# Patient Record
Sex: FEMALE | Race: BLACK OR AFRICAN AMERICAN | NOT HISPANIC OR LATINO | Employment: OTHER | URBAN - METROPOLITAN AREA
[De-identification: names, ages, dates, MRNs, and addresses within clinical notes are randomized per-mention and may not be internally consistent; named-entity substitution may affect disease eponyms.]

---

## 2020-01-31 ENCOUNTER — OFFICE VISIT (OUTPATIENT)
Dept: GASTROENTEROLOGY | Facility: CLINIC | Age: 70
End: 2020-01-31
Payer: COMMERCIAL

## 2020-01-31 VITALS
HEART RATE: 69 BPM | WEIGHT: 115 LBS | BODY MASS INDEX: 19.63 KG/M2 | HEIGHT: 64 IN | DIASTOLIC BLOOD PRESSURE: 78 MMHG | SYSTOLIC BLOOD PRESSURE: 120 MMHG

## 2020-01-31 DIAGNOSIS — R14.0 BLOATING: ICD-10-CM

## 2020-01-31 DIAGNOSIS — D50.9 IRON DEFICIENCY ANEMIA, UNSPECIFIED IRON DEFICIENCY ANEMIA TYPE: ICD-10-CM

## 2020-01-31 DIAGNOSIS — R14.0 BLOATING: Primary | ICD-10-CM

## 2020-01-31 DIAGNOSIS — K90.41 NCGS (NON-CELIAC GLUTEN SENSITIVITY): ICD-10-CM

## 2020-01-31 DIAGNOSIS — K59.01 SLOW TRANSIT CONSTIPATION: Primary | ICD-10-CM

## 2020-01-31 DIAGNOSIS — Z12.11 COLON CANCER SCREENING: ICD-10-CM

## 2020-01-31 PROCEDURE — 99204 OFFICE O/P NEW MOD 45 MIN: CPT | Performed by: INTERNAL MEDICINE

## 2020-01-31 RX ORDER — FERROUS SULFATE 325(65) MG
325 TABLET ORAL
COMMUNITY

## 2020-01-31 RX ORDER — WHEAT DEXTRIN 3 G/3.8 G
POWDER (GRAM) ORAL
Refills: 0
Start: 2020-01-31

## 2020-01-31 NOTE — PATIENT INSTRUCTIONS
Constipation   WHAT YOU NEED TO KNOW:   Constipation is when you have hard, dry bowel movements, or you go longer than usual between bowel movements  DISCHARGE INSTRUCTIONS:   Return to the emergency department if:   · You have blood in your bowel movements  · You have a fever and abdominal pain with the constipation  Contact your healthcare provider if:   · Your constipation gets worse  · You start to vomit  · You have questions or concerns about your condition or care  Medicines:   · Medicine or a fiber supplement  may help make your bowel movement softer  A laxative may help relax and loosen your intestines to help you have a bowel movement  You may also be given medicine to increase fluid in your intestines  The fluid may help move bowel movements through your intestines  · Take your medicine as directed  Contact your healthcare provider if you think your medicine is not helping or if you have side effects  Tell him of her if you are allergic to any medicine  Keep a list of the medicines, vitamins, and herbs you take  Include the amounts, and when and why you take them  Bring the list or the pill bottles to follow-up visits  Carry your medicine list with you in case of an emergency  Manage your constipation:   · Drink liquids as directed  You may need to drink extra liquids to help soften and move your bowels  Ask how much liquid to drink each day and which liquids are best for you  · Eat high-fiber foods  This may help decrease constipation by adding bulk to your bowel movements  High-fiber foods include fruit, vegetables, whole-grain breads and cereals, and beans  Your healthcare provider or dietitian can help you create a high-fiber meal plan  · Exercise regularly  Regular physical activity can help stimulate your intestines  Ask which exercises are best for you  · Schedule a time each day to have a bowel movement    This may help train your body to have regular bowel movements  Bend forward while you are on the toilet to help move the bowel movement out  Sit on the toilet for at least 10 minutes, even if you do not have a bowel movement  Follow up with your healthcare provider as directed:  Write down your questions so you remember to ask them during your visits  © 2017 [address] Information is for End User's use only and may not be sold, redistributed or otherwise used for commercial purposes  All illustrations and images included in CareNotes® are the copyrighted property of [employer] "[employer]  or [employer]  The above information is an  only  It is not intended as medical advice for individual conditions or treatments  Talk to your doctor, nurse or pharmacist before following any medical regimen to see if it is safe and effective for you

## 2020-01-31 NOTE — PROGRESS NOTES
[address] [place] [address] [facility]  Ta Pinto 78 y o  female MRN: [id number]  Encounter: [id number]    ASSESSMENT AND PLAN:      1  Slow transit constipation  65-year-old female here today at the request of Dr Monica Villarreal for 1 year of progressive bloating and constipation with gluten intolerance  It looks like slow transit constipation given she is moving her bowels daily, just incompletely  She has a fairly healthy lifestyle and diet  - will start with some Benefiber and see if this is enough to get her bowels moving completely  If not, we can start some MiraLax  - Wheat Dextrin (BENEFIBER) POWD; Take by mouth daily with breakfast; Refill: 0  - CBC and differential; Future  - TSH, 3rd generation with Free T4 reflex; Future    2  NCGS (non-celiac gluten sensitivity)  About 5 years of gluten sensitivity, she is pretty much 90% gluten free  But not completely  Check celiac serologies  Check EGD to biopsy  - Schedule EGD @ [address] [place] [address]   - Celiac Disease Antibody Profile; Future    3  Bloating  Continue dairy and gluten limited diets  4  Iron deficiency anemia, unspecified iron deficiency anemia type  Intermittent issues with iron deficiency anemia  She takes iron supplementation for the past 20 years  Unclear if this is really necessary  She is mainly vegetarian  Check iron studies today  - CBC and differential; Future  - Fe+TIBC+Victor Manuel; Future    5  Colon cancer screening  Average risk, had a negative colonoscopy in 2009 and is overdue  We will schedule today  - Schedule Colonoscopy @ [address] [place] [address]  Followup Appointment:  6 months  ______________________________________________________________________    Chief Complaint   Patient presents with   Jeffrey Ramos       HPI:   Ta Pinto is a 78y o  year old female who presents today as a new patient at the request of Dr Monica Villarreal for 1 year of progressive bloating    Patient states that she is a vegetarian mainly, also mainly 6 to gluten and dairy free diet  She is very healthy eating, exercises regularly, drinks a lot of water  About 5 years ago, she was having some issues with bloating where she felt that her belly was getting slightly distended, passing a lot of gas from below  However, she started to do dairy and gluten limited diet switch improved  However in the past year, she is progressively getting more bloating  She has also not moving her bowels completely  She moves her bowels daily but has often trouble starting the bowel movement  Often has to strain  Denies bleeding  Weight is stable  Appetite is good  Denies nausea vomiting  No real upper GI symptoms  Did have some heartburn for a little bit but started taking aloe juice which helps  Historical Information   Past Medical History:   Diagnosis Date    Anemia     Multiple thyroid nodules     Osteopenia      Past Surgical History:   Procedure Laterality Date    BREAST BIOPSY      COLONOSCOPY  2009    normal, @ [facility]    HYSTERECTOMY      KNEE SURGERY      cyst removal     Social History     Substance and Sexual Activity   Alcohol Use Yes    Frequency: Monthly or less     Social History     Substance and Sexual Activity   Drug Use Never     Social History     Tobacco Use   Smoking Status Never Smoker   Smokeless Tobacco Never Used     Family History   Problem Relation Age of Onset    Colon polyps Neg Hx     Colon cancer Neg Hx     Celiac disease Neg Hx        Meds/Allergies     Current Outpatient Medications:     ferrous sulfate 325 (65 Fe) mg tablet    Wheat Dextrin (BENEFIBER) POWD    No Known Allergies    PHYSICAL EXAM:    Blood pressure 120/78, pulse 69, height 5' 4" (1 626 m), weight 52 2 kg (115 lb)  Body mass index is 19 74 kg/m²  General Appearance: NAD, cooperative, alert  Eyes: Anicteric, PERRLA, EOMI  ENT:  Normocephalic, atraumatic, normal mucosa      Neck:  Supple, symmetrical, trachea midline,   Resp:  Clear to auscultation bilaterally; no rales, rhonchi or wheezing; respirations unlabored   CV:  S1 S2, Regular rate and rhythm; no murmur, rub, or gallop  GI:  Soft, non-tender, non-distended; normal bowel sounds; no masses, no organomegaly   Rectal: Deferred  Musculoskeletal: No cyanosis, clubbing or edema  Normal ROM  Skin:  No jaundice, rashes, or lesions   Heme/Lymph: No palpable cervical lymphadenopathy  Psych: Normal affect, good eye contact  Neuro: No gross deficits, AAOx3    Lab Results:   No results found for: WBC, HGB, HCT, MCV, PLT  No results found for: NA, K, CL, CO2, ANIONGAP, BUN, CREATININE, GLUCOSE, GLUF, CALCIUM, CORRECTEDCA, AST, ALT, ALKPHOS, PROT, BILITOT, EGFR  No results found for: IRON, TIBC, FERRITIN  No results found for: LIPASE    Radiology Results:   No results found  REVIEW OF SYSTEMS:    CONSTITUTIONAL: Denies any fever, chills, rigors, and weight loss  HEENT: No earache or tinnitus  Denies hearing loss or visual disturbances  CARDIOVASCULAR: No chest pain or palpitations  RESPIRATORY: Denies any cough, hemoptysis, shortness of breath or dyspnea on exertion  GASTROINTESTINAL: As noted in the History of Present Illness  GENITOURINARY: No problems with urination  Denies any hematuria or dysuria  NEUROLOGIC: No dizziness or vertigo, denies headaches  MUSCULOSKELETAL: Denies any muscle or joint pain  SKIN: Denies skin rashes or itching  ENDOCRINE: Denies excessive thirst  Denies intolerance to heat or cold  PSYCHOSOCIAL: Denies depression or anxiety  Denies any recent memory loss

## 2020-01-31 NOTE — LETTER
January 31, 2020     Xiang Garcia, [address]    Patient: Anisha Kong   YOB: 1950   Date of Visit: 1/31/2020       Dear Dr Fay Sebastian: Thank you for referring Anisha Kong to me for evaluation  Below are my notes for this consultation  If you have questions, please do not hesitate to call me  I look forward to following your patient along with you  Sincerely,        Vito Nielsen MD        CC: No Recipients  Vito Nielsen MD  1/31/2020 10:00 AM  Incomplete    [address] [facility]  Anisha Kong 78 y o  female MRN: [id number]  Encounter: [id number]    ASSESSMENT AND PLAN:      1  Slow transit constipation  71-year-old female here today at the request of Dr Fay Sebastian for 1 year of progressive bloating and constipation with gluten intolerance  It looks like slow transit constipation given she is moving her bowels daily, just incompletely  She has a fairly healthy lifestyle and diet  - will start with some Benefiber and see if this is enough to get her bowels moving completely  If not, we can start some MiraLax  - Wheat Dextrin (BENEFIBER) POWD; Take by mouth daily with breakfast; Refill: 0  - CBC and differential; Future  - TSH, 3rd generation with Free T4 reflex; Future    2  NCGS (non-celiac gluten sensitivity)  About 5 years of gluten sensitivity, she is pretty much 90% gluten free  But not completely  Check celiac serologies  Check EGD to biopsy  - Schedule EGD @ [facility]   - Celiac Disease Antibody Profile; Future    3  Bloating  Continue dairy and gluten limited diets  4  Iron deficiency anemia, unspecified iron deficiency anemia type  Intermittent issues with iron deficiency anemia  She takes iron supplementation for the past 20 years  Unclear if this is really necessary  She is mainly vegetarian  Check iron studies today      - CBC and differential; Future  - Fe+TIBC+Victor Manuel; Future    5  Colon cancer screening  Average risk, had a negative colonoscopy in 2009 and is overdue  We will schedule today  - Schedule Colonoscopy @ [facility]  Followup Appointment:  6 months  ______________________________________________________________________    Chief Complaint   Patient presents with   Odis Essex       HPI:   Padmini Barnard is a 78y o  year old female who presents today as a new patient at the request of Dr Fabiola Dang for 1 year of progressive bloating  Patient states that she is a vegetarian mainly, also mainly 6 to gluten and dairy free diet  She is very healthy eating, exercises regularly, drinks a lot of water  About 5 years ago, she was having some issues with bloating where she felt that her belly was getting slightly distended, passing a lot of gas from below  However, she started to do dairy and gluten limited diet switch improved  However in the past year, she is progressively getting more bloating  She has also not moving her bowels completely  She moves her bowels daily but has often trouble starting the bowel movement  Often has to strain  Denies bleeding  Weight is stable  Appetite is good  Denies nausea vomiting  No real upper GI symptoms  Did have some heartburn for a little bit but started taking aloe juice which helps      Historical Information   Past Medical History:   Diagnosis Date    Anemia     Multiple thyroid nodules     Osteopenia      Past Surgical History:   Procedure Laterality Date    BREAST BIOPSY      [facility]OSCOPY  2009    [facility], [facility]    HYSTERECTOMY      KNEE SURGERY      cyst removal     Social History     Substance and Sexual Activity   Alcohol Use Yes    Frequency: Monthly or less     Social History     Substance and Sexual Activity   Drug Use Never     Social History     Tobacco Use   Smoking Status Never Smoker   Smokeless Tobacco Never Used     Family History   Problem Relation Age of Onset    Colon polyps Neg Hx     Colon cancer Neg Hx     Celiac disease Neg Hx        Meds/Allergies     Current Outpatient Medications:     ferrous sulfate 325 (65 Fe) mg tablet    Wheat Dextrin (BENEFIBER) POWD    Allergies not on file    PHYSICAL EXAM:    Blood pressure 120/78, pulse 69, height 5' 4" (1 626 m), weight 52 2 kg (115 lb)  Body mass index is 19 74 kg/m²  General Appearance: NAD, cooperative, alert  Eyes: Anicteric, PERRLA, EOMI  ENT:  Normocephalic, atraumatic, normal mucosa  Neck:  Supple, symmetrical, trachea midline,   Resp:  Clear to auscultation bilaterally; no rales, rhonchi or wheezing; respirations unlabored   CV:  S1 S2, Regular rate and rhythm; no murmur, rub, or gallop  GI:  Soft, non-tender, non-distended; normal bowel sounds; no masses, no organomegaly   Rectal: Deferred  Musculoskeletal: No cyanosis, clubbing or edema  Normal ROM  Skin:  No jaundice, rashes, or lesions   Heme/Lymph: No palpable cervical lymphadenopathy  Psych: Normal affect, good eye contact  Neuro: No gross deficits, AAOx3    Lab Results:   No results found for: WBC, HGB, HCT, MCV, PLT  No results found for: NA, K, CL, CO2, ANIONGAP, BUN, CREATININE, GLUCOSE, GLUF, CALCIUM, CORRECTEDCA, AST, ALT, ALKPHOS, PROT, BILITOT, EGFR  No results found for: IRON, TIBC, FERRITIN  No results found for: LIPASE    Radiology Results:   No results found  REVIEW OF SYSTEMS:    CONSTITUTIONAL: Denies any fever, chills, rigors, and weight loss  HEENT: No earache or tinnitus  Denies hearing loss or visual disturbances  CARDIOVASCULAR: No chest pain or palpitations  RESPIRATORY: Denies any cough, hemoptysis, shortness of breath or dyspnea on exertion  GASTROINTESTINAL: As noted in the History of Present Illness  GENITOURINARY: No problems with urination  Denies any hematuria or dysuria  NEUROLOGIC: No dizziness or vertigo, denies headaches  MUSCULOSKELETAL: Denies any muscle or joint pain     SKIN: Denies skin rashes or itching  ENDOCRINE: Denies excessive thirst  Denies intolerance to heat or cold  PSYCHOSOCIAL: Denies depression or anxiety  Denies any recent memory loss

## 2020-02-17 ENCOUNTER — TELEPHONE (OUTPATIENT)
Dept: GASTROENTEROLOGY | Facility: CLINIC | Age: 70
End: 2020-02-17

## 2020-02-17 NOTE — TELEPHONE ENCOUNTER
Patient left a message stating that she wants to cancel her procedure for this week-wants to check with her insurance company

## 2020-03-23 ENCOUNTER — TELEPHONE (OUTPATIENT)
Dept: GASTROENTEROLOGY | Facility: CLINIC | Age: 70
End: 2020-03-23

## 2020-03-23 NOTE — TELEPHONE ENCOUNTER
FYI-pt was sched to have combo w/you on 4/2/20-you marked pt as a 2 to resched to [facility]-pt does not want to reschedule at the hospital until at least end of April beginning of May

## 2020-04-30 ENCOUNTER — TELEPHONE (OUTPATIENT)
Dept: GASTROENTEROLOGY | Facility: CLINIC | Age: 70
End: 2020-04-30

## 2021-11-02 ENCOUNTER — HOSPITAL ENCOUNTER (EMERGENCY)
Facility: HOSPITAL | Age: 71
Discharge: HOME | End: 2021-11-02
Attending: EMERGENCY MEDICINE
Payer: COMMERCIAL

## 2021-11-02 VITALS
RESPIRATION RATE: 30 BRPM | TEMPERATURE: 97.6 F | HEART RATE: 65 BPM | SYSTOLIC BLOOD PRESSURE: 200 MMHG | OXYGEN SATURATION: 99 % | DIASTOLIC BLOOD PRESSURE: 106 MMHG

## 2021-11-02 DIAGNOSIS — I10 ASYMPTOMATIC HYPERTENSION: Primary | ICD-10-CM

## 2021-11-02 LAB
ATRIAL RATE: 65 BPM
P AXIS: 54 DEGREES
PR INTERVAL: 184 MS
QRS AXIS: -46 DEGREES
QRSD INTERVAL: 80 MS
QT INTERVAL: 408 MS
QTC INTERVAL: 424 MS
T WAVE AXIS: 15 DEGREES
VENTRICULAR RATE: 65 BPM

## 2021-11-02 PROCEDURE — 93010 ELECTROCARDIOGRAM REPORT: CPT | Performed by: INTERNAL MEDICINE

## 2021-11-02 PROCEDURE — 99283 EMERGENCY DEPT VISIT LOW MDM: CPT

## 2021-11-02 PROCEDURE — 93005 ELECTROCARDIOGRAM TRACING: CPT

## 2021-11-02 PROCEDURE — 99284 EMERGENCY DEPT VISIT MOD MDM: CPT | Performed by: EMERGENCY MEDICINE
